# Patient Record
(demographics unavailable — no encounter records)

---

## 2025-01-22 NOTE — HISTORY OF PRESENT ILLNESS
[Home] : at home, [unfilled] , at the time of the visit. [Medical Office: ([facility])___] : at the medical office located in  [Verbal consent obtained from patient] : the patient, [unfilled] [FreeTextEntry1] : Lexie presents for f/u of prolapse. Previously scheduled reconstructive surgery was postponed due to other medical work-up, most recently due to hematologic abnormalities. Pt is unsure of what the abnormality was but reports that the most recent labs were normal, and she is ready to proceed with rescheduling. We reviewed surgical approach, and she continue to desire robotic approach with hysterectomy and sacrocolpopexy.

## 2025-01-22 NOTE — DISCUSSION/SUMMARY
[FreeTextEntry1] : Will reschedule RA-supracervical hysterectomy, BSO, sacrocolpopexy, MUS, cystoscopy. Advised patient to have hematologist records sent for review.

## 2025-01-31 NOTE — ASSESSMENT
[FreeTextEntry1] : 63 yo female, hx of triple nec Breast ca, pending GHULAM in May2024 referred for evaluation of anemia. S/p colonoscopy ( initial) 2021 with 12mm adenoma, with villous features. She has frequent gerd, but is not on medications. Attributes it to "spicy" foods. She denies dysphagia. She has gained 7 lbs since January. SHe denies cp or sob.  RTO 1/31/25- sees Dr. Evelyn Edward with [facility]-- pt meeting with her next week to discuss tumor markers. Pt deferred for now GHULAM BSO. GERD resolved. Reviewed colonoscopy from last June which was normal. EGD with gastritis. NO new meds.  IMP: 1. anemia 2. Hx of GERD resolved 3. Hx of adenoma-recent normal colonoscpoy 4. Uterine fibroid, pending GHULAM  PLAN: PRN followup advised pt to have heme onc fax me / call me wrt plan
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
## 2025-02-05 NOTE — PROCEDURE
[Endometrial Biopsy] : an endometrial biopsy [Postmenopausal Bleeding] : postmenopausal bleeding [Silver Nitrate] : silver nitrate [Yes] : the specimen was sent to pathology [No Complications] : none [Tolerated Well] : the patient tolerated the procedure well

## 2025-02-05 NOTE — REASON FOR VISIT
[FreeTextEntry1] : \par  \par  Follow up - initially seen 2/3/2021 for consultation for prophylactic BSO.  Presents to f/u on thickened endometrium\par  H/O indeterminate BRCA /  triple negative breast cancer.  \par

## 2025-02-05 NOTE — PHYSICAL EXAM
[Chaperone Present] : A chaperone was present in the examining room during all aspects of the physical examination [[id number]] : A chaperone was present during the pelvic exam. [Normal] : Recto-Vaginal Exam: Normal [de-identified] : 8 wk size uterus, mobile, no adnexal masses [de-identified] : smooth rectovag septum, no cul de sac nodules [Fully active, able to carry on all pre-disease performance without restriction] : Status 0 - Fully active, able to carry on all pre-disease performance without restriction

## 2025-02-05 NOTE — DISCUSSION/SUMMARY
[FreeTextEntry1] : patient with recurrent  bleeding, awaiting hyst for prolapse embx done today, will call patient wtih results can proceed with surgery when ready all questions answered

## 2025-02-25 NOTE — VITALS
[Maximal Pain Intensity: 8/10] : 8/10 [Least Pain Intensity: 0/10] : 0/10 [Pain Duration: ___] : Pain duration: [unfilled] [Pain Location: ___] : Pain Location: [unfilled] [OTC] : OTC [90: Able to carry normal activity; minor signs or symptoms of disease.] : 90: Able to carry normal activity; minor signs or symptoms of disease.  [Pain Description/Quality: ___] : Pain description/quality: [unfilled]

## 2025-02-26 NOTE — REVIEW OF SYSTEMS
[Fatigue] : fatigue [Negative] : Allergic/Immunologic [Esophagitis: Grade 2 - Symptomatic; altered eating/swallowing;  oral supplements indicated] : Esophagitis: Grade 2 - Symptomatic; altered eating/swallowing;  oral supplements indicated [Localized Edema: Grade 2 - Moderate localized edema and intervention indicated; limiting instrumental ADL] : Localized Edema: Grade 2 - Moderate localized edema and intervention indicated; limiting instrumental ADL [Urinary Incontinence: Grade 3 - Intervention indicated (e.g., clamp, collagen injections); operative intervention indicated; limiting self care ADL] : Urinary Incontinence: Grade 3 - Intervention indicated (e.g., clamp, collagen injections); operative intervention indicated; limiting self care ADL [de-identified] : as noted  [FreeTextEntry8] : as noted

## 2025-02-26 NOTE — REVIEW OF SYSTEMS
[Fatigue] : fatigue [Negative] : Allergic/Immunologic [Esophagitis: Grade 2 - Symptomatic; altered eating/swallowing;  oral supplements indicated] : Esophagitis: Grade 2 - Symptomatic; altered eating/swallowing;  oral supplements indicated [Localized Edema: Grade 2 - Moderate localized edema and intervention indicated; limiting instrumental ADL] : Localized Edema: Grade 2 - Moderate localized edema and intervention indicated; limiting instrumental ADL [Urinary Incontinence: Grade 3 - Intervention indicated (e.g., clamp, collagen injections); operative intervention indicated; limiting self care ADL] : Urinary Incontinence: Grade 3 - Intervention indicated (e.g., clamp, collagen injections); operative intervention indicated; limiting self care ADL [FreeTextEntry8] : as noted  [de-identified] : as noted

## 2025-02-26 NOTE — PHYSICAL EXAM
[Normal] : well developed, well nourished, in no acute distress [] : no respiratory distress [Supraclavicular Lymph Nodes Enlarged Bilaterally] : supraclavicular [Inguinal Lymph Nodes Enlarged Bilaterally] : inguinal [de-identified] : well healed surgical scars  bilateral breast reconstruction no dominant mass

## 2025-02-26 NOTE — HISTORY OF PRESENT ILLNESS
[FreeTextEntry1] : Ms. Owen is a 62 year old female with Stage II TNBC of the right breast s/p b/l mastectomy and adjuvant chemotherapy in December 2020; 4 cycles Cytoxan and Adriamycin Q2 weeks followed by weekly Taxol for 12 weeks. She received RT to the right chest wall and nodes to a total dose of 5,000 cGy in 25 fractions from 3/3/21 - 4/7/21.   10/2022: She was emotional from the recent passing of daughter from leukemia. She continues to have discomfort to the right arm. Plans to continue PT. Has lymphedema to right arm, using sleeve and compression garments. Is s/p left axillary U/S today.  1/6/23: s/p bilateral breast revision with bilateral breast reduction.   3/29/23: Today, she is feeling well. Continues to experience pain to the right arm, 7/10 taking Tylenol prn with relief. Continues with compression garments and sleeve for lymphedema of the right arm, which she has not been using consistently.. Continues to follow up with Dr. Adame. CT CAP 11/2022:  Interval slight improvement in bilateral peripheral groundglass opacities. No evidence of metastatic disease in the chest, abdomen or pelvis.  CT CAP 6/9/23: No evidence of metastatic disease. Left chest fluid collection at the inferior aspect of the breast reconstruction.  3/13/24: She presents today for routine follow-up. She has persistent lymphedema RUE for which she wears a compression sleeve, as well as pain RUE when she uses her arm a lot. Pain rating up to 8/10, relieved with Tylenol. Occasional shooting pains b/l breasts, tolerable. Following with Dr. Adame, next apptmt in June.  Following with Dr. Abdi and uro-gyn, planning for GHULAM in May for stress incontinence and uterovaginal prolapse. She had a hysteroscopy, D&C on 1/19/24; pathology showing submucosal leiomyoma, benign endometrial tissue. She did have one episode of vaginal bleeding prior to this biopsy, and another episode of bleeding lasting 3-4 days in February. No pelvic pain.  Also following with GI for anemia and GERD, planning for endoscopy and colonoscopy.  Still struggling with the deaths of her daughter and mother. She continues working and also cares for her 16-month old granddaughter.   2/25/25: Here for a follow up. Had PMB, thickened endometrium, s/p EMBx which was benign, scant tissue. PAP/HPV 1/29/24 negative. Scheduled for a hysterectomy 4/23/25 for uterovaginal prolapse.  CT CAP 4/2024: KENNEDY.  CT Chest 11/2024: Unchanged mild nonspecific groundglass in both lungs. Follows with Dr. Adame.  Reports that "markers" have been going up Continues with urinary symptoms, incomplete emptying, urinary frequency, denies any dysuria. No bowel complaints. Occasional vaginal spotting. Reports discomfort due to the prolapse. Surgery is scheduled.

## 2025-02-26 NOTE — PHYSICAL EXAM
[Normal] : well developed, well nourished, in no acute distress [] : no respiratory distress [Supraclavicular Lymph Nodes Enlarged Bilaterally] : supraclavicular [Inguinal Lymph Nodes Enlarged Bilaterally] : inguinal [de-identified] : well healed surgical scars  bilateral breast reconstruction no dominant mass

## 2025-04-18 NOTE — HISTORY OF PRESENT ILLNESS
[FreeTextEntry1] : Amy Owen is a 60yo P2 with stage 3 uterovaginal prolapse and stress urinary incontinence desiring surgical management. Pt has a h/o triple negative breast cancer s/p bilateral mastectomy and chemo/radiation. Pt also has a h/o postmenopausal bleeding s/p multiple negative work-up including hysteroscopy D&C with Dr. Abdi in 2021 and 2024. Final path consistent with submucosal leiomyoma and benign endometrial tissue (1/19/24). and recently repeated EMB on 2/3, which was benign. Pt's history discussed with Dr. Abdi and plan for frozen section at time of prolapse repair with Oncology back-up.   UDS: intermediate 500, +LUIS, no DO, PVR 0 ml. Pelvic US 10/23: fibroids measuring up to 5.8 cm left anterior location, EMS 4 mm, 1.1 cm echogenic lesion (s/p hysteroscopy D&C as above) Most recent pap testing negative, HPV negative (1/29/24). S/p colonoscopy 6/2024, only notable for diverticulosis   PMH: HTN, h/o triple negative breast CA PSH: bilateral mastectomy with EPIFANIO flap reconstruction, L shoulder surgery (bone spur), open appendectomy, abdominal myomectomy OBGYN Hx: P2 Social Hx: non-smoker

## 2025-04-18 NOTE — HISTORY OF PRESENT ILLNESS
[FreeTextEntry1] : Amy Owen is a 62yo P2 with stage 3 uterovaginal prolapse and stress urinary incontinence desiring surgical management. Pt has a h/o triple negative breast cancer s/p bilateral mastectomy and chemo/radiation. Pt also has a h/o postmenopausal bleeding s/p multiple negative work-up including hysteroscopy D&C with Dr. Abdi in 2021 and 2024. Final path consistent with submucosal leiomyoma and benign endometrial tissue (1/19/24). and recently repeated EMB on 2/3, which was benign. Pt's history discussed with Dr. Abdi and plan for frozen section at time of prolapse repair with Oncology back-up.   UDS: long-term 500, +LUIS, no DO, PVR 0 ml. Pelvic US 10/23: fibroids measuring up to 5.8 cm left anterior location, EMS 4 mm, 1.1 cm echogenic lesion (s/p hysteroscopy D&C as above) Most recent pap testing negative, HPV negative (1/29/24). S/p colonoscopy 6/2024, only notable for diverticulosis   PMH: HTN, h/o triple negative breast CA PSH: bilateral mastectomy with EPIFANIO flap reconstruction, L shoulder surgery (bone spur), open appendectomy, abdominal myomectomy OBGYN Hx: P2 Social Hx: non-smoker

## 2025-04-18 NOTE — PHYSICAL EXAM
[Normal Appearance] : general appearance was normal [Atrophy] : atrophy [Dry Mucosa] : dry mucosa [1] : 1 [Aa ____] : Aa [unfilled] [Ba ____] : Ba [unfilled] [C ____] : C [unfilled] [GH ____] : GH [unfilled] [PB ____] : PB [unfilled] [TVL ____] : TVL  [unfilled] [Ap ____] : Ap [unfilled] [Bp ____] : Bp [unfilled] [D ____] : D [unfilled] [Normal] : normal [Post Void Residual ____ml] : post void residual was [unfilled] ml [FreeTextEntry1] : General: Well, appearing, no acute distress HEENT: Normocephalic, atraumatic Respiratory: Speaking in full sentences comfortably, normal work of breathing and no cough during visit Extremities: No upper extremity edema noted Skin: No obvious rash or skin lesions Neuro: Alert and oriented x 3, speech is fluent, normal rate Psych: Normal mood and affect [Scar] : a scar was noted [FreeTextEntry3] : no significant hypermobility

## 2025-04-18 NOTE — DISCUSSION/SUMMARY
[FreeTextEntry1] : The patient initially considered robotic-assisted supracervical hysterectomy with sacrocolpopexy. However, due to her recurrent postmenopausal bleeding and benign endometrial sampling, the case was discussed with gynecologic oncology, and a recommendation for intraoperative frozen section was made.  Various management options for prolapse, both surgical and non-surgical, were discussed. Given PMB, the plan was changed to a total laparoscopic hysterectomy. We discussed the risks and benefits of both supracervical and total hysterectomy, including the slightly increased risk of mesh exposure with TLH.  The rationale for proceeding with TLH was to obtain a complete uterine specimen for pathology given the concerning bleeding. After a thorough discussion, the patient understood and consented to proceed with robotic-assisted TLH with uterosacral ligament suspension and intraoperative frozen section. We also reviewed plan for management of stress urinary incontinence, with include concomitant placement of a mid-urethral sling.   We reviewed risks of the surgery including but not limited to: bleeding, infection, pain, urinary retention, failure to reduce prolapse, prolapse recurrence, persistence or recurrence of stress urinary incontinence, voiding dysfunction, dyspareunia, bowel obstruction, venous thromboembolism (VTE), and mesh erosion/exposure. We discussed the risk of possible conversion to open surgery via exploratory laparotomy. We discussed the risk of injury to her bladder, ureters, urethra, vagina, rectum and bowel, as well as blood vessels, particularly in light of her previous surgical history and possible presence of scar tissue. We discussed the possibility of needing a blood transfusion. The patient does not have any restrictions to this if needed. We discussed the possibility of going home with a catheter and of needing additional surgery in the future.   All questions were answered. She expressed understanding of the above and would like to proceed with the scheduled surgery. I provided pre-operative and post-operative instructions and counseling on expectations, including pain and bowel management regimen: Tylenol/Motrin and MiraLAX postop. I also instructed patient to start taking a stool softener the week prior to her procedure.

## 2025-05-08 NOTE — DISCUSSION/SUMMARY
[FreeTextEntry1] : Pt is s/p robot-assisted laparoscopic TLH/BSO, uterosacral ligament suspension, MEMO, posterior repair, mid-urethral sling, and cystoscopy on 4/23/25. Pt is doing well, denies any concerns today. Normal PVR. Routine post-op exam. Precautions reviewed. Follow-up in 4 weeks or earlier PRN.

## 2025-05-08 NOTE — SUBJECTIVE
[FreeTextEntry1] : Doing well, denies any complaints. [FreeTextEntry7] : Some perineal discomfort from sutures, otherwise pain controlled [FreeTextEntry6] : Normal [FreeTextEntry5] : Normal [FreeTextEntry4] : Normal

## 2025-05-08 NOTE — OBJECTIVE
[Post Void Residual ____ ml] : Post Void Residual was [unfilled] ml [Soft and Nontender] : soft and nontender [Clean, Dry, Intact] : Clean, Dry, Intact [Good Support] : Good support [Healing well] : healing well

## 2025-06-12 NOTE — SUBJECTIVE
[FreeTextEntry1] : Doing well, denies any complaints. [FreeTextEntry7] : None. [FreeTextEntry6] : Normal [FreeTextEntry5] : Normal [FreeTextEntry4] : Normal

## 2025-06-12 NOTE — OBJECTIVE
[Soft and Nontender] : soft and nontender [Clean, Dry, Intact] : Clean, Dry, Intact [Good Support] : Good support [FreeTextEntry3] : apical suture exposed, removed

## 2025-06-12 NOTE — DISCUSSION/SUMMARY
[FreeTextEntry1] : Pt is s/p robot-assisted laparoscopic TLH/BSO, uterosacral ligament suspension, MEMO, posterior repair, mid-urethral sling, and cystoscopy on 4/23/25. Pt is doing well, denies any concerns today. Apical permanent suture exposed, removed without difficulty. Can resume all normal activities. Follow-up in 12 months or earlier PRN.